# Patient Record
Sex: MALE | Race: WHITE | URBAN - METROPOLITAN AREA
[De-identification: names, ages, dates, MRNs, and addresses within clinical notes are randomized per-mention and may not be internally consistent; named-entity substitution may affect disease eponyms.]

---

## 2022-03-19 PROBLEM — M67.922 TENDINOPATHY OF LEFT BICEPS TENDON: Status: ACTIVE | Noted: 2021-06-29

## 2022-03-20 PROBLEM — M25.522 LEFT ELBOW PAIN: Status: ACTIVE | Noted: 2021-04-16

## 2022-12-12 ENCOUNTER — OFFICE VISIT (OUTPATIENT)
Dept: CARDIOLOGY CLINIC | Age: 42
End: 2022-12-12
Payer: COMMERCIAL

## 2022-12-12 VITALS
BODY MASS INDEX: 29.54 KG/M2 | HEART RATE: 64 BPM | DIASTOLIC BLOOD PRESSURE: 70 MMHG | WEIGHT: 211 LBS | HEIGHT: 71 IN | SYSTOLIC BLOOD PRESSURE: 120 MMHG

## 2022-12-12 DIAGNOSIS — R07.2 PRECORDIAL PAIN: Primary | Chronic | ICD-10-CM

## 2022-12-12 DIAGNOSIS — K21.9 GASTROESOPHAGEAL REFLUX DISEASE WITHOUT ESOPHAGITIS: Chronic | ICD-10-CM

## 2022-12-12 PROCEDURE — 99204 OFFICE O/P NEW MOD 45 MIN: CPT | Performed by: INTERNAL MEDICINE

## 2022-12-12 PROCEDURE — 93000 ELECTROCARDIOGRAM COMPLETE: CPT | Performed by: INTERNAL MEDICINE

## 2022-12-12 ASSESSMENT — ENCOUNTER SYMPTOMS
SHORTNESS OF BREATH: 0
COUGH: 0
ABDOMINAL PAIN: 0
BACK PAIN: 0

## 2022-12-12 NOTE — PROGRESS NOTES
[facility] CARDIOLOGY  [address] [facility], [address]      22      NAME:  Jose Eduardo Peña  : 1980  MRN: [id number]      SUBJECTIVE:   Jose Eduardo Peña is a 38 y.o. male seen for a follow up visit regarding the following:     Chief Complaint   Patient presents with    Procedure    Consultation    Chest Pain       HPI:   38 y.o. male with history of tobacco abuse. Reports intermittent chest pain for the last 7 years. He reports EKGs and stress test historically done at another cardiology practice which were all normal.  These were done many years ago. He is referred to our office for evaluation of CV risk. He denies any active chest pain. He is a current smoker. He denies hypertension or dyslipidemia. Overall patient has not seen a physician in many years. There are no recent blood work available. EKG today demonstrates no acute ST-T wave changes. Past Medical History, Past Surgical History, Family history, Social History, and Medications were all reviewed with the patient today and updated as necessary. Current Outpatient Medications   Medication Sig Dispense Refill    esomeprazole (NEXIUM) 20 MG delayed release capsule Take by mouth daily       No current facility-administered medications for this visit. Patient Active Problem List    Diagnosis Date Noted    Precordial pain 2022     Priority: High    Gastroesophageal reflux disease without esophagitis 2022     Priority: High    Tendinopathy of left biceps tendon 2021     Priority: Low    Left elbow pain 2021     Priority: Low      No family history on file. Social History     Tobacco Use    Smoking status: Former     Types: Cigarettes    Smokeless tobacco: Not on file   Substance Use Topics    Alcohol use: Not on file       Review Of Symptoms    Review of Systems   Constitutional: Negative for fever and malaise/fatigue. HENT:  Negative for nosebleeds.     Cardiovascular:  Negative for chest pain, dyspnea on exertion and palpitations. Respiratory:  Negative for cough and shortness of breath. Musculoskeletal:  Negative for back pain, muscle cramps, muscle weakness and myalgias. Gastrointestinal:  Negative for abdominal pain. Neurological:  Negative for dizziness. Physical Exam  Blood pressure 120/70, pulse 64, height 5' 11\" (1.803 m), weight 211 lb (95.7 kg). Physical Exam  HENT:      Head: Normocephalic and atraumatic. Eyes:      Extraocular Movements: Extraocular movements intact. Cardiovascular:      Rate and Rhythm: Normal rate and regular rhythm. Heart sounds: No murmur heard. Pulmonary:      Effort: Pulmonary effort is normal.      Breath sounds: Normal breath sounds. Abdominal:      Palpations: Abdomen is soft. Musculoskeletal:         General: Normal range of motion. Skin:     General: Skin is warm and dry. Neurological:      General: No focal deficit present. Mental Status: He is oriented to person, place, and time. Medical problems, medical history and test results were reviewed with the patient today. No results found for: CHOL  No results found for: TRIG  No results found for: HDL  No results found for: LDLCHOLESTEROL, LDLCALC  No results found for: LABVLDL, VLDL  No results found for: CHOLHDLRATIO     No results found for: LABA1C  No results found for: EAG     No results found for: NA, K, CL, CO2, BUN, CREATININE, GLUCOSE, CALCIUM, PROT, LABALBU, BILITOT, ALKPHOS, AST, ALT, LABGLOM, GFRAA, AGRATIO, GLOB    No results found for: NA, K, CL, CO2, BUN, CREATININE, GLUCOSE, CALCIUM     No results found for: WBC, HGB, HCT, MCV, PLT          ASSESSMENT:    Soila Concepcion was seen today for procedure, consultation and chest pain. Diagnoses and all orders for this visit:    Chest pain -Long history of chest pain over the last 7 to 8 years. He reports prior stress testing that was normal.  I am unable to document this.   EKG today with no acute ST-T wave changes. No acute chest pain recently. Patient reports family history of coronary artery disease plan coronary calcium score. We will check baseline labs to assess lipid and glucose. -     EKG 12 lead  -     Comprehensive Metabolic Panel; Future  -     Lipid Panel; Future  -     CBC; Future  -     CHG CT HEART W/O CONTRAST QUANT EVAL CORONARY CALCIUM  -     CHG CT HEART W/O CONTRAST QUANT EVAL CORONARY CALCIUM; Future  -     CHG CT HEART W/O CONTRAST QUANT EVAL CORONARY CALCIUM; Future    Precordial pain -see above  -     Comprehensive Metabolic Panel; Future  -     Lipid Panel; Future  -     CBC; Future    Gastroesophageal reflux disease without esophagitis -continue proton pump inhibitor. Problem List Items Addressed This Visit          Digestive    Gastroesophageal reflux disease without esophagitis (Chronic)       Other    Precordial pain (Chronic)    Relevant Orders    Comprehensive Metabolic Panel    Lipid Panel    CBC     Other Visit Diagnoses       Chest pain    -  Primary    Relevant Orders    Comprehensive Metabolic Panel    Lipid Panel    CBC    EKG 12 lead (Completed)    Comprehensive Metabolic Panel    Lipid Panel    CBC    CHG CT HEART W/O CONTRAST QUANT EVAL CORONARY CALCIUM    CHG CT HEART W/O CONTRAST QUANT EVAL CORONARY CALCIUM    CHG CT HEART W/O CONTRAST QUANT EVAL CORONARY CALCIUM            There are no discontinued medications. Patient has been instructed and agrees to call our office with any issues or other concerns related to their cardiac condition(s) and/or complaint(s). Return for Return after testing.        Marvin Pinzon MD  12/12/2022

## 2022-12-29 ENCOUNTER — HOSPITAL ENCOUNTER (OUTPATIENT)
Dept: CT IMAGING | Age: 42
Discharge: HOME | End: 2022-12-29

## 2022-12-29 DIAGNOSIS — R07.2 PRECORDIAL PAIN: Chronic | ICD-10-CM

## 2022-12-29 PROCEDURE — 75571 CT HRT W/O DYE W/CA TEST: CPT

## 2023-01-03 ENCOUNTER — TELEPHONE (OUTPATIENT)
Dept: CARDIOLOGY CLINIC | Age: 43
End: 2023-01-03

## 2023-01-03 NOTE — TELEPHONE ENCOUNTER
----- Message from Erika Fuentes MD sent at 1/3/2023  4:35 PM EST -----  Tell him congratulations his calcium score is 0.

## 2023-07-07 ENCOUNTER — OFFICE VISIT (OUTPATIENT)
Age: 43
End: 2023-07-07
Payer: COMMERCIAL

## 2023-07-07 VITALS
DIASTOLIC BLOOD PRESSURE: 88 MMHG | WEIGHT: 213 LBS | SYSTOLIC BLOOD PRESSURE: 130 MMHG | HEIGHT: 71 IN | BODY MASS INDEX: 29.82 KG/M2 | HEART RATE: 60 BPM

## 2023-07-07 DIAGNOSIS — K21.9 GASTROESOPHAGEAL REFLUX DISEASE WITHOUT ESOPHAGITIS: Chronic | ICD-10-CM

## 2023-07-07 DIAGNOSIS — R07.2 PRECORDIAL PAIN: Primary | Chronic | ICD-10-CM

## 2023-07-07 PROCEDURE — 99214 OFFICE O/P EST MOD 30 MIN: CPT | Performed by: INTERNAL MEDICINE

## 2023-07-07 ASSESSMENT — ENCOUNTER SYMPTOMS
ABDOMINAL PAIN: 0
COUGH: 0
BACK PAIN: 0
SHORTNESS OF BREATH: 0

## 2023-07-07 NOTE — PROGRESS NOTES
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 male with history of tobacco abuse. Reports intermittent chest pain for the last 7 years. He reports EKGs and stress test was done at another cardiology practice which were all normal.  These were done many years ago. He is referred to our office for evaluation of CV risk. He denies any active chest pain. He is a current smoker. He denies hypertension or dyslipidemia. Overall patient has not seen a physician in many years. There are no recent blood work available. He did have a CCS of 0 2022. Past Medical History, Past Surgical History, Family history, Social History, and Medications were all reviewed with the patient today and updated as necessary. Current Outpatient Medications   Medication Sig Dispense Refill    esomeprazole (NEXIUM) 20 MG delayed release capsule Take by mouth daily       No current facility-administered medications for this visit. Patient Active Problem List    Diagnosis Date Noted    Precordial pain 2022     Priority: High    Gastroesophageal reflux disease without esophagitis 2022     Priority: High    Tendinopathy of left biceps tendon 2021     Priority: Low    Left elbow pain 2021     Priority: Low      No family history on file. Social History     Tobacco Use    Smoking status: Former     Types: Cigarettes    Smokeless tobacco: Not on file   Substance Use Topics    Alcohol use: Not on file       Review Of Symptoms    Review of Systems   Constitutional: Negative for fever and malaise/fatigue. HENT:  Negative for nosebleeds.     Cardiovascular:  Negative for chest pain, dyspnea on exertion and